# Patient Record
Sex: FEMALE | Race: WHITE | Employment: UNEMPLOYED | URBAN - METROPOLITAN AREA
[De-identification: names, ages, dates, MRNs, and addresses within clinical notes are randomized per-mention and may not be internally consistent; named-entity substitution may affect disease eponyms.]

---

## 2017-08-02 ENCOUNTER — APPOINTMENT (OUTPATIENT)
Dept: LAB | Facility: REFERENCE LAB | Age: 22
End: 2017-08-02
Attending: FAMILY MEDICINE
Payer: COMMERCIAL

## 2017-08-02 ENCOUNTER — OFFICE VISIT (OUTPATIENT)
Dept: FAMILY MEDICINE CLINIC | Facility: CLINIC | Age: 22
End: 2017-08-02

## 2017-08-02 VITALS
HEIGHT: 62 IN | SYSTOLIC BLOOD PRESSURE: 120 MMHG | HEART RATE: 68 BPM | WEIGHT: 132.38 LBS | BODY MASS INDEX: 24.36 KG/M2 | OXYGEN SATURATION: 98 % | DIASTOLIC BLOOD PRESSURE: 68 MMHG

## 2017-08-02 DIAGNOSIS — Z01.84 IMMUNITY STATUS TESTING: ICD-10-CM

## 2017-08-02 DIAGNOSIS — Z02.0 ENCOUNTER FOR SCHOOL HEALTH EXAMINATION: Primary | ICD-10-CM

## 2017-08-02 DIAGNOSIS — Z11.1 SCREENING-PULMONARY TB: ICD-10-CM

## 2017-08-02 LAB — RUBV IGG SER-ACNC: 25.9 IU/ML

## 2017-08-02 PROCEDURE — 86735 MUMPS ANTIBODY: CPT | Performed by: FAMILY MEDICINE

## 2017-08-02 PROCEDURE — 86706 HEP B SURFACE ANTIBODY: CPT | Performed by: FAMILY MEDICINE

## 2017-08-02 PROCEDURE — 99202 OFFICE O/P NEW SF 15 MIN: CPT | Performed by: FAMILY MEDICINE

## 2017-08-02 PROCEDURE — 86762 RUBELLA ANTIBODY: CPT | Performed by: FAMILY MEDICINE

## 2017-08-02 PROCEDURE — 86765 RUBEOLA ANTIBODY: CPT | Performed by: FAMILY MEDICINE

## 2017-08-02 PROCEDURE — 86480 TB TEST CELL IMMUN MEASURE: CPT | Performed by: FAMILY MEDICINE

## 2017-08-02 PROCEDURE — 86787 VARICELLA-ZOSTER ANTIBODY: CPT | Performed by: FAMILY MEDICINE

## 2017-08-02 PROCEDURE — 36415 COLL VENOUS BLD VENIPUNCTURE: CPT | Performed by: FAMILY MEDICINE

## 2017-08-02 NOTE — PROGRESS NOTES
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  Previous PCP was pediatrician, Dr. Ag Alba. 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    Need results for patient once they return and update vaccines as needed pending titer results    2. Immunity status testing    - RUBEOLA ANTIBODIES, IGG; Future  - MUMPS ANTIBODIES, IGG; Future  - RUBELLA, IGG; Future  - HEPATITIS B SURFACE ANTIBODY;  Future  -

## 2017-08-03 LAB
HBV SURFACE AB SER-ACNC: 11.42 MIU/ML (ref ?–10)
HBV SURFACE AG SERPL QL IA: REACTIVE

## 2017-08-04 LAB
M TB IFN-G CD4+ BCKGRND COR BLD-ACNC: 0 IU/ML
M TB IFN-G CD4+ T-CELLS BLD-ACNC: 0.03 IU/ML
M TB TUBERC IFN-G BLD QL: NEGATIVE
M TB TUBERC IGNF/MITOGEN IGNF CONTROL: 3.79 IU/ML

## 2017-08-08 LAB
MEV IGG SER-ACNC: 106 AU/ML (ref 30–?)
MUV IGG SER IA-ACNC: 6.6 AU/ML (ref 11–?)
VZV IGG SER IA-ACNC: 518.8 (ref 165–?)

## 2017-08-09 ENCOUNTER — OFFICE VISIT (OUTPATIENT)
Dept: FAMILY MEDICINE CLINIC | Facility: CLINIC | Age: 22
End: 2017-08-09

## 2017-08-09 DIAGNOSIS — Z23 NEED FOR MMR VACCINE: Primary | ICD-10-CM

## 2017-08-09 PROCEDURE — 90707 MMR VACCINE SC: CPT | Performed by: FAMILY MEDICINE

## 2017-08-09 PROCEDURE — 90471 IMMUNIZATION ADMIN: CPT | Performed by: FAMILY MEDICINE

## 2017-08-09 NOTE — PROGRESS NOTES
Patient presented for MMR booster due to non-immunity. Given by Brown Smith MA, with on complications. Patient to return in 4 weeks for 2nd booster.      Ravi Jean DO  08/09/17  11:19 AM

## 2017-12-24 ENCOUNTER — OFFICE VISIT (OUTPATIENT)
Dept: FAMILY MEDICINE CLINIC | Facility: CLINIC | Age: 22
End: 2017-12-24

## 2017-12-24 VITALS
RESPIRATION RATE: 12 BRPM | WEIGHT: 125 LBS | TEMPERATURE: 99 F | BODY MASS INDEX: 22.15 KG/M2 | DIASTOLIC BLOOD PRESSURE: 70 MMHG | SYSTOLIC BLOOD PRESSURE: 100 MMHG | HEART RATE: 92 BPM | OXYGEN SATURATION: 98 % | HEIGHT: 63 IN

## 2017-12-24 DIAGNOSIS — J02.9 PHARYNGITIS, UNSPECIFIED ETIOLOGY: ICD-10-CM

## 2017-12-24 DIAGNOSIS — Z20.818 EXPOSURE TO STREPTOCOCCAL PHARYNGITIS: ICD-10-CM

## 2017-12-24 DIAGNOSIS — B27.90 MONONUCLEOSIS: Primary | ICD-10-CM

## 2017-12-24 PROCEDURE — 86308 HETEROPHILE ANTIBODY SCREEN: CPT | Performed by: NURSE PRACTITIONER

## 2017-12-24 PROCEDURE — 99213 OFFICE O/P EST LOW 20 MIN: CPT | Performed by: NURSE PRACTITIONER

## 2017-12-24 PROCEDURE — 87081 CULTURE SCREEN ONLY: CPT | Performed by: NURSE PRACTITIONER

## 2017-12-24 PROCEDURE — 87880 STREP A ASSAY W/OPTIC: CPT | Performed by: NURSE PRACTITIONER

## 2017-12-24 NOTE — PROGRESS NOTES
CHIEF COMPLAINT:   Patient presents with:  Pharyngitis: X 2 days, fever of 101.8Fl; exposure to strep        HPI:   Pina Brice is a 25year old female presents to clinic with complaint of sore throat. Patient has had for 2 days.  Symptoms have been wo NECK: supple  LUNGS: clear to auscultation bilaterally, no wheezes or rhonchi. Breathing is non labored. CARDIO: RRR without murmur  GI: good BS's,no masses, hepatosplenomegaly, or tenderness on direct palpation X 4 quadrants.    EXTREMITIES: no cyanosis, Discussed OTC medication such as phenylephrine, mucinex, and decongestants as needed and directed on packaging.      Discussed with patient s/s of worsening infection/condition and importance of prompt medical re-evaluation including when to seek emergency Fatigue, which may be severe and can occasionally last for months  Some people have all of these symptoms while others have only one or two symptoms, such as sore throat or fever and enlarged lymph nodes.  Young children and older adults may have only a fev Pain and fever — Sore throat, muscle aches, and fever can be treated with non-prescription medications, such as acetaminophen (Tylenol® and others) or ibuprofen (Motrin®, Advil®). Acetaminophen is broken down by the liver.  Thus, it is important to closely When you do begin participating in sports activities again, we recommend starting slowly, increasing activity gradually. Even highly trained athletes may not feel as fit after having mono as they did before the illness.   WHEN WILL I FEEL BETTER? — Most peo Mono can cause your spleen to swell. The spleen is a fist-sized organ in the upper left abdomen that stores red blood cells. Injury to a swollen spleen can cause the spleen to rupture. This can cause life-threatening internal bleeding.  To avoid this, do no © 3654-9838 [employer] [address]. [address] [facility] [place], [address]. All rights reserved. This information is not intended as a substitute for professional medical care. Always follow your healthcare professional's instructions.         Self-Ca · Limit contact with pets and with allergy-causing substances, such as pollen and mold. · When you’re around someone with a sore throat or cold, wash your hands often to keep viruses or bacteria from spreading. · Don’t strain your vocal cords.   Call your

## 2017-12-24 NOTE — PATIENT INSTRUCTIONS
INFECTIOUS MONONUCLEOSIS — Infectious mononucleosis, also known as mono, is an infection that causes fever, sore throat, fatigue, and enlarged lymph nodes in the neck. It most commonly occurs in adolescents and young adults.  Although not generally consider Some people have all of these symptoms while others have only one or two symptoms, such as sore throat or fever and enlarged lymph nodes. Young children and older adults may have only a fever and muscle aches.  (See \"Infectious mononucleosis in adults and Rest — Mono can cause severe fatigue, although most people recover within two to four weeks. For some, significant tiredness lasts for weeks to months.  Early in the infection, it is important to get adequate rest, although complete bed rest is not necessar Mononucleosis (also called mono) is a contagious viral infection. Most infants and children exposed to the virus get only mild flu-like symptoms or no symptoms at all. However, infection is usually more serious in teens and young adults.  While the virus is · Ask your healthcare provider about using over-the-counter medicines to treat symptoms such as fever, pain, or an itchy rash. · Over-the-counter throat lozenges may help soothe a sore throat.  Gargling with warm salt water (1/2 teaspoon in 1 glass of warm · Keep your throat moist by drinking 6 or more glasses of clear liquids every day. · Run a cool-air humidifier in your room overnight. · Avoid cigarette smoke.   · Suck on throat lozenges, cough drops, hard candy, ice chips, or frozen fruit-juice bars.  U © 5712-0416 [employer] [address]. [address] [facility] [place], [facility] [place]. All rights reserved. This information is not intended as a substitute for professional medical care. Always follow your healthcare professional's instructions.

## 2017-12-29 ENCOUNTER — OFFICE VISIT (OUTPATIENT)
Dept: FAMILY MEDICINE CLINIC | Facility: CLINIC | Age: 22
End: 2017-12-29

## 2017-12-29 VITALS
HEIGHT: 63 IN | SYSTOLIC BLOOD PRESSURE: 100 MMHG | RESPIRATION RATE: 16 BRPM | DIASTOLIC BLOOD PRESSURE: 72 MMHG | OXYGEN SATURATION: 98 % | TEMPERATURE: 98 F | WEIGHT: 125 LBS | HEART RATE: 90 BPM | BODY MASS INDEX: 22.15 KG/M2

## 2017-12-29 DIAGNOSIS — H10.9 CONJUNCTIVITIS OF RIGHT EYE, UNSPECIFIED CONJUNCTIVITIS TYPE: Primary | ICD-10-CM

## 2017-12-29 PROCEDURE — 99213 OFFICE O/P EST LOW 20 MIN: CPT | Performed by: NURSE PRACTITIONER

## 2017-12-29 RX ORDER — OFLOXACIN 3 MG/ML
1 SOLUTION/ DROPS OPHTHALMIC 4 TIMES DAILY
Qty: 1 BOTTLE | Refills: 0 | Status: SHIPPED | OUTPATIENT
Start: 2017-12-29 | End: 2018-01-05

## 2017-12-30 NOTE — PATIENT INSTRUCTIONS
· It is very important to finish full course of antibiotics. · Avoid touching eyes. · Stressed importance of good handwashing as conjunctivitis is very contagious. · Warm compresses to affected eye prn.   · Can return to work/school after on [employer] [facility] · Don't wear contact lenses until your eyes have healed and all symptoms are gone. Follow-up care  Follow up with your healthcare provider, or as advised.   When to seek medical advice  Call your healthcare provider right away if any of these occur:  · Wor

## 2017-12-30 NOTE — PROGRESS NOTES
CHIEF COMPLAINT:   Patient presents with:  Conjunctivitis: right eye X 1 day      HPI:   Eric Valenzuela is a 25year old female who presents with chief complaint of \"pink eye\". Symptoms began  1  days ago. Symptoms have been stable since onset.    Patient LYMPH: no preauricular lymphadenopathy.  Posterior cervical lymphadenopathy    ASSESSMENT AND PLAN:   Kristen Chun is a 25year old female who presents with:    ASSESSMENT:   Conjunctivitis of right eye, unspecified conjunctivitis type  (primary encounter · Use prescribed antibiotic eye drops or ointment as directed to treat the infection. · Apply a warm compress (towel soaked in warm water) to the affected eye 3 to 4 times a day. Do this just before applying medicine to the eye.   · Use a warm, wet cloth t

## 2018-07-27 ENCOUNTER — NURSE ONLY (OUTPATIENT)
Dept: FAMILY MEDICINE CLINIC | Facility: CLINIC | Age: 23
End: 2018-07-27
Payer: COMMERCIAL

## 2018-07-27 DIAGNOSIS — Z11.1 SCREENING FOR TUBERCULOSIS: Primary | ICD-10-CM

## 2018-07-27 PROCEDURE — 86580 TB INTRADERMAL TEST: CPT | Performed by: FAMILY MEDICINE

## 2018-07-30 ENCOUNTER — NURSE ONLY (OUTPATIENT)
Dept: FAMILY MEDICINE CLINIC | Facility: CLINIC | Age: 23
End: 2018-07-30

## 2018-07-30 LAB — INDURATION (): 0 MM (ref 0–11)

## 2018-08-10 ENCOUNTER — NURSE ONLY (OUTPATIENT)
Dept: FAMILY MEDICINE CLINIC | Facility: CLINIC | Age: 23
End: 2018-08-10
Payer: COMMERCIAL

## 2018-08-10 DIAGNOSIS — Z11.1 SCREENING FOR TUBERCULOSIS: Primary | ICD-10-CM

## 2018-08-10 PROCEDURE — 86580 TB INTRADERMAL TEST: CPT | Performed by: FAMILY MEDICINE

## 2018-08-13 ENCOUNTER — NURSE ONLY (OUTPATIENT)
Dept: FAMILY MEDICINE CLINIC | Facility: CLINIC | Age: 23
End: 2018-08-13
Payer: COMMERCIAL

## 2018-08-13 LAB — INDURATION (): 0 MM (ref 0–11)

## 2018-11-16 ENCOUNTER — OFFICE VISIT (OUTPATIENT)
Dept: FAMILY MEDICINE CLINIC | Facility: CLINIC | Age: 23
End: 2018-11-16
Payer: COMMERCIAL

## 2018-11-16 VITALS
WEIGHT: 130 LBS | DIASTOLIC BLOOD PRESSURE: 68 MMHG | OXYGEN SATURATION: 99 % | HEIGHT: 64 IN | RESPIRATION RATE: 16 BRPM | SYSTOLIC BLOOD PRESSURE: 102 MMHG | TEMPERATURE: 98 F | HEART RATE: 77 BPM | BODY MASS INDEX: 22.2 KG/M2

## 2018-11-16 DIAGNOSIS — K13.0 ANGULAR CHEILITIS: Primary | ICD-10-CM

## 2018-11-16 PROCEDURE — 99213 OFFICE O/P EST LOW 20 MIN: CPT | Performed by: NURSE PRACTITIONER

## 2018-11-16 RX ORDER — MUPIROCIN CALCIUM 20 MG/G
1 CREAM TOPICAL 2 TIMES DAILY
Qty: 15 G | Refills: 0 | Status: SHIPPED | OUTPATIENT
Start: 2018-11-16 | End: 2018-11-23

## 2018-11-16 NOTE — PROGRESS NOTES
CHIEF COMPLAINT:   Patient presents with:  Rash: X 6 days, worsened. No fever. angles of mouth, itching. HPI:   Lan Hussein is a 25year old female who presents for evaluation of a rash. Per patient rash started in the past 6 days.  Rash has be /68   Pulse 77   Temp 97.7 °F (36.5 °C) (Tympanic)   Resp 16   Ht 64\"   Wt 130 lb   LMP 11/11/2018 (Exact Date)   SpO2 99%   Breastfeeding?  No   BMI 22.31 kg/m²   GENERAL: well developed, well nourished,in no apparent distress  SKIN: Bialteral angle May also use clotrimazole BID X 7 days    Mupirocin skin cream or ointment  Brand Names: Bactroban, Centany, Centany AT  What is this medicine? MUPIROCIN (myoo PEER oh sin) is an antibiotic. It is used on the skin to treat skin infections.   How should I u Store at room temperature between 20 and 25 degrees C (68 and 77 degrees F). Throw away any unused medicine after the expiration date. What should I tell my health care provider before I take this medicine?   They need to know if you have any of these cond

## 2018-11-16 NOTE — PATIENT INSTRUCTIONS
May also use clotrimazole BID X 7 days    Mupirocin skin cream or ointment  Brand Names: Bactroban, Centany, Centany AT  What is this medicine? MUPIROCIN (myoo PEER oh sin) is an antibiotic. It is used on the skin to treat skin infections.   How should I Store at room temperature between 20 and 25 degrees C (68 and 77 degrees F). Throw away any unused medicine after the expiration date. What should I tell my health care provider before I take this medicine?   They need to know if you have any of these cond

## 2019-05-15 ENCOUNTER — HOSPITAL ENCOUNTER (EMERGENCY)
Facility: HOSPITAL | Age: 24
Discharge: HOME | End: 2019-05-15
Attending: PHYSICIAN ASSISTANT
Payer: COMMERCIAL

## 2019-05-15 VITALS
SYSTOLIC BLOOD PRESSURE: 125 MMHG | RESPIRATION RATE: 18 BRPM | OXYGEN SATURATION: 99 % | TEMPERATURE: 99 F | DIASTOLIC BLOOD PRESSURE: 95 MMHG | HEART RATE: 81 BPM | HEIGHT: 64 IN | WEIGHT: 130 LBS | BODY MASS INDEX: 22.2 KG/M2

## 2019-05-15 DIAGNOSIS — J02.0 ACUTE STREPTOCOCCAL PHARYNGITIS: Primary | ICD-10-CM

## 2019-05-15 DIAGNOSIS — R03.0 ELEVATED BLOOD PRESSURE READING: ICD-10-CM

## 2019-05-15 PROCEDURE — 99283 EMERGENCY DEPT VISIT LOW MDM: CPT

## 2019-05-15 PROCEDURE — 87430 STREP A AG IA: CPT

## 2019-05-15 RX ORDER — PENICILLIN V POTASSIUM 500 MG/1
500 TABLET ORAL 2 TIMES DAILY
Qty: 20 TABLET | Refills: 0 | Status: SHIPPED | OUTPATIENT
Start: 2019-05-15 | End: 2019-05-25

## 2019-05-15 RX ORDER — IBUPROFEN 600 MG/1
TABLET ORAL
Qty: 20 TABLET | Refills: 0 | Status: SHIPPED | OUTPATIENT
Start: 2019-05-15 | End: 2021-02-01

## 2019-05-15 NOTE — ED PROVIDER NOTES
Patient Seen in: [facility] AND [facility] Emergency Department    History   Patient presents with:  Sore Throat    Stated Complaint: sore throat    HPI    36-year-old female presents with chief complaint of sore throat. Onset this morning.   Patient denies sick air)       Current:/84   Pulse 86   Temp 98.5 °F (36.9 °C) (Temporal)   Resp 18   Ht 162.6 cm (5' 4\")   Wt 59 kg   SpO2 99%   BMI 22.31 kg/m²       PULSE OX within normal limits on room air as interpreted by this provider.     Constitutional: The pat discussed with patient. The patient was informed of their elevated blood pressure reading in the Emergency Department. They were informed of the dangers of undiagnosed and untreated hypertension.   Education regarding lifestyle modifications and the nee

## 2021-02-01 ENCOUNTER — OFFICE VISIT (OUTPATIENT)
Dept: FAMILY MEDICINE CLINIC | Facility: CLINIC | Age: 26
End: 2021-02-01
Payer: COMMERCIAL

## 2021-02-01 VITALS
SYSTOLIC BLOOD PRESSURE: 110 MMHG | HEART RATE: 73 BPM | WEIGHT: 118 LBS | DIASTOLIC BLOOD PRESSURE: 72 MMHG | BODY MASS INDEX: 20.14 KG/M2 | HEIGHT: 64 IN | OXYGEN SATURATION: 99 %

## 2021-02-01 DIAGNOSIS — Z30.41 ENCOUNTER FOR SURVEILLANCE OF CONTRACEPTIVE PILLS: ICD-10-CM

## 2021-02-01 DIAGNOSIS — Z00.00 ENCOUNTER FOR ROUTINE ADULT HEALTH EXAMINATION WITHOUT ABNORMAL FINDINGS: Primary | ICD-10-CM

## 2021-02-01 DIAGNOSIS — Z23 NEED FOR VACCINATION: ICD-10-CM

## 2021-02-01 PROCEDURE — 3078F DIAST BP <80 MM HG: CPT | Performed by: FAMILY MEDICINE

## 2021-02-01 PROCEDURE — 90715 TDAP VACCINE 7 YRS/> IM: CPT | Performed by: FAMILY MEDICINE

## 2021-02-01 PROCEDURE — 99395 PREV VISIT EST AGE 18-39: CPT | Performed by: FAMILY MEDICINE

## 2021-02-01 PROCEDURE — 90472 IMMUNIZATION ADMIN EACH ADD: CPT | Performed by: FAMILY MEDICINE

## 2021-02-01 PROCEDURE — 90651 9VHPV VACCINE 2/3 DOSE IM: CPT | Performed by: FAMILY MEDICINE

## 2021-02-01 PROCEDURE — 3074F SYST BP LT 130 MM HG: CPT | Performed by: FAMILY MEDICINE

## 2021-02-01 PROCEDURE — 3008F BODY MASS INDEX DOCD: CPT | Performed by: FAMILY MEDICINE

## 2021-02-01 PROCEDURE — 90471 IMMUNIZATION ADMIN: CPT | Performed by: FAMILY MEDICINE

## 2021-02-01 RX ORDER — NORGESTIMATE AND ETHINYL ESTRADIOL 7DAYSX3 28
1 KIT ORAL DAILY
Qty: 90 TABLET | Refills: 3 | Status: SHIPPED | OUTPATIENT
Start: 2021-02-01 | End: 2021-12-02

## 2021-02-01 RX ORDER — NORGESTIMATE AND ETHINYL ESTRADIOL 7DAYSX3 28
1 KIT ORAL DAILY
COMMUNITY
Start: 2020-11-09 | End: 2021-02-01

## 2021-02-01 NOTE — PROGRESS NOTES
HPI:   Vincenzo Martinez is a 26year old female who presents for a complete physical exam.     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. : No. Children: None.          EXAM:   Wt Readings from Last 6 Encounters:  02/01/21 : 118 lb (53.5 kg)  05/15/19 : 130 lb (59 kg)  11/ breast self-exam  -Breast cancer screening/mammograms and clinical breast exams  -Cervical cancer screening/pap smears  -Adequate calcium and Vitamin D intake to prevent osteoporosis  -Healthy diet including adequate intake of vegetables and fruits, approp

## 2021-02-01 NOTE — PATIENT INSTRUCTIONS
-Try CeraVe or Vanicream moisturizing cream for dry skin      Prevention Guidelines, Women Ages 25 to 44  Screening tests and vaccines are an important part of managing your health.  A screening test is done to find possible disorders or diseases in people increased risk for infection  At routine exams   Hepatitis C Anyone at increased risk  At routine exams   HIV All women At routine exams3     Obesity All women in this age group  At routine exams   Syphilis Women at increased risk for infection should talk with their healthcare provider  PCV13: 1 dose ages 23 to 72 (protects against 13 types of pneumococcal bacteria)   PPSV23: 1 to 2 doses through age 59, or 1 dose at 72 or older (protects against 23 types of pneumococcal bacteria)    Tetanus/diphtheria/pert [address]. [address] [facility], [address] [place] [address]. All rights reserved. This information is not intended as a substitute for professional medical care. Always follow your healthcare professional's instructions.

## 2021-02-15 ENCOUNTER — OFFICE VISIT (OUTPATIENT)
Dept: FAMILY MEDICINE CLINIC | Facility: CLINIC | Age: 26
End: 2021-02-15
Payer: COMMERCIAL

## 2021-02-15 VITALS
SYSTOLIC BLOOD PRESSURE: 118 MMHG | BODY MASS INDEX: 20.49 KG/M2 | WEIGHT: 120 LBS | DIASTOLIC BLOOD PRESSURE: 72 MMHG | HEART RATE: 63 BPM | OXYGEN SATURATION: 99 % | HEIGHT: 64 IN

## 2021-02-15 DIAGNOSIS — Z12.4 PAP SMEAR FOR CERVICAL CANCER SCREENING: Primary | ICD-10-CM

## 2021-02-15 DIAGNOSIS — Z11.3 VENEREAL DISEASE SCREENING: ICD-10-CM

## 2021-02-15 PROCEDURE — 87491 CHLMYD TRACH DNA AMP PROBE: CPT | Performed by: FAMILY MEDICINE

## 2021-02-15 PROCEDURE — 99213 OFFICE O/P EST LOW 20 MIN: CPT | Performed by: FAMILY MEDICINE

## 2021-02-15 PROCEDURE — 3074F SYST BP LT 130 MM HG: CPT | Performed by: FAMILY MEDICINE

## 2021-02-15 PROCEDURE — 3078F DIAST BP <80 MM HG: CPT | Performed by: FAMILY MEDICINE

## 2021-02-15 PROCEDURE — 3008F BODY MASS INDEX DOCD: CPT | Performed by: FAMILY MEDICINE

## 2021-02-15 PROCEDURE — 87591 N.GONORRHOEAE DNA AMP PROB: CPT | Performed by: FAMILY MEDICINE

## 2021-02-15 NOTE — PATIENT INSTRUCTIONS
Why Have a Pap Test?  Early on, cervical changes don't cause symptoms. Often the only way to know you have cervical changes is to do a Pap test. A Pap test can find these problems early, when they are easier to treat.  Pap tests can also find some infecti · Starting at age 27, the preferred testing is a Pap test done with an HPV test every 5 years. This should be done until age 72. Another option for women in this 27 to 72 age group is to have just the Pap test done every 3 years.   · You may need a differen

## 2021-02-15 NOTE — PROGRESS NOTES
CC:  Patient presents with:  Pap      HPI: 26year old female here for pap smear. Has never had a pap smear before. No concerns today and just resumed OCP's a few weeks ago.   Not currently sexually active but has been in the past. Last had GC/Chlamydia Asked        Exercise: Not Asked        Seat Belt: Not Asked        Special Diet: Not Asked        Stress Concern: Not Asked        Weight Concern: Not Asked    Social History Narrative      No h/o abuse       Current Outpatient Medications   Medication Si

## 2021-02-16 LAB
C TRACH DNA SPEC QL NAA+PROBE: NEGATIVE
N GONORRHOEA DNA SPEC QL NAA+PROBE: NEGATIVE

## 2021-12-02 RX ORDER — NORGESTIMATE AND ETHINYL ESTRADIOL 7DAYSX3 28
1 KIT ORAL DAILY
Qty: 84 TABLET | Refills: 0 | Status: SHIPPED | OUTPATIENT
Start: 2021-12-02

## 2021-12-02 NOTE — TELEPHONE ENCOUNTER
A refill request was received for:  Requested Prescriptions     Pending Prescriptions Disp Refills   • NORGESTIM-ETH ESTRAD TRIPHASIC 0.18/0.215/0.25 MG-35 MCG Oral Tab [Pharmacy Med Name: Kailyn Wilson 0.18-0.215-0.25/0.035] 84 tablet 2     Sig: TAKE 1 TABLET B

## (undated) NOTE — ED AVS SNAPSHOT
Kevin Pereira   MRN: [id number]    Department:  [facility] Emergency Department   Date of Visit:  5/15/2019           Disclosure     Insurance plans vary and the physician(s) referred by the ER may not be covered by your plan.  Please contact y CARE PHYSICIAN AT ONCE OR RETURN IMMEDIATELY TO THE EMERGENCY DEPARTMENT. If you have been prescribed any medication(s), please fill your prescription right away and begin taking the medication(s) as directed.   If you believe that any of the medications